# Patient Record
Sex: FEMALE | Race: WHITE | NOT HISPANIC OR LATINO | Employment: FULL TIME | URBAN - METROPOLITAN AREA
[De-identification: names, ages, dates, MRNs, and addresses within clinical notes are randomized per-mention and may not be internally consistent; named-entity substitution may affect disease eponyms.]

---

## 2023-05-22 LAB
ALANINE AMINOTRANSFERASE (SGPT) (U/L) IN SER/PLAS: 17 U/L (ref 7–45)
ALBUMIN (G/DL) IN SER/PLAS: 4.8 G/DL (ref 3.4–5)
ALKALINE PHOSPHATASE (U/L) IN SER/PLAS: 83 U/L (ref 33–110)
ANION GAP IN SER/PLAS: 15 MMOL/L (ref 10–20)
APPEARANCE, URINE: CLEAR
ASPARTATE AMINOTRANSFERASE (SGOT) (U/L) IN SER/PLAS: 18 U/L (ref 9–39)
BASOPHILS (10*3/UL) IN BLOOD BY AUTOMATED COUNT: 0.03 X10E9/L (ref 0–0.1)
BASOPHILS/100 LEUKOCYTES IN BLOOD BY AUTOMATED COUNT: 0.5 % (ref 0–2)
BILIRUBIN TOTAL (MG/DL) IN SER/PLAS: 0.4 MG/DL (ref 0–1.2)
BILIRUBIN, URINE: NEGATIVE
BLOOD, URINE: NEGATIVE
CALCIDIOL (25 OH VITAMIN D3) (NG/ML) IN SER/PLAS: 36 NG/ML
CALCIUM (MG/DL) IN SER/PLAS: 9.8 MG/DL (ref 8.6–10.6)
CARBON DIOXIDE, TOTAL (MMOL/L) IN SER/PLAS: 26 MMOL/L (ref 21–32)
CHLORIDE (MMOL/L) IN SER/PLAS: 102 MMOL/L (ref 98–107)
COBALAMIN (VITAMIN B12) (PG/ML) IN SER/PLAS: 456 PG/ML (ref 211–911)
COLOR, URINE: COLORLESS
CREATININE (MG/DL) IN SER/PLAS: 0.8 MG/DL (ref 0.5–1.05)
EOSINOPHILS (10*3/UL) IN BLOOD BY AUTOMATED COUNT: 0.11 X10E9/L (ref 0–0.7)
EOSINOPHILS/100 LEUKOCYTES IN BLOOD BY AUTOMATED COUNT: 1.7 % (ref 0–6)
ERYTHROCYTE DISTRIBUTION WIDTH (RATIO) BY AUTOMATED COUNT: 12.6 % (ref 11.5–14.5)
ERYTHROCYTE MEAN CORPUSCULAR HEMOGLOBIN CONCENTRATION (G/DL) BY AUTOMATED: 32.8 G/DL (ref 32–36)
ERYTHROCYTE MEAN CORPUSCULAR VOLUME (FL) BY AUTOMATED COUNT: 86 FL (ref 80–100)
ERYTHROCYTES (10*6/UL) IN BLOOD BY AUTOMATED COUNT: 5.06 X10E12/L (ref 4–5.2)
GFR FEMALE: 86 ML/MIN/1.73M2
GLUCOSE (MG/DL) IN SER/PLAS: 64 MG/DL (ref 74–99)
GLUCOSE, URINE: NEGATIVE MG/DL
HEMATOCRIT (%) IN BLOOD BY AUTOMATED COUNT: 43.6 % (ref 36–46)
HEMOGLOBIN (G/DL) IN BLOOD: 14.3 G/DL (ref 12–16)
IMMATURE GRANULOCYTES/100 LEUKOCYTES IN BLOOD BY AUTOMATED COUNT: 0.5 % (ref 0–0.9)
KETONES, URINE: NEGATIVE MG/DL
LEUKOCYTE ESTERASE, URINE: NEGATIVE
LEUKOCYTES (10*3/UL) IN BLOOD BY AUTOMATED COUNT: 6.4 X10E9/L (ref 4.4–11.3)
LYMPHOCYTES (10*3/UL) IN BLOOD BY AUTOMATED COUNT: 1.6 X10E9/L (ref 1.2–4.8)
LYMPHOCYTES/100 LEUKOCYTES IN BLOOD BY AUTOMATED COUNT: 24.9 % (ref 13–44)
MONOCYTES (10*3/UL) IN BLOOD BY AUTOMATED COUNT: 0.37 X10E9/L (ref 0.1–1)
MONOCYTES/100 LEUKOCYTES IN BLOOD BY AUTOMATED COUNT: 5.8 % (ref 2–10)
NEUTROPHILS (10*3/UL) IN BLOOD BY AUTOMATED COUNT: 4.28 X10E9/L (ref 1.2–7.7)
NEUTROPHILS/100 LEUKOCYTES IN BLOOD BY AUTOMATED COUNT: 66.6 % (ref 40–80)
NITRITE, URINE: NEGATIVE
NRBC (PER 100 WBCS) BY AUTOMATED COUNT: 0 /100 WBC (ref 0–0)
PH, URINE: 6 (ref 5–8)
PLATELETS (10*3/UL) IN BLOOD AUTOMATED COUNT: 238 X10E9/L (ref 150–450)
POTASSIUM (MMOL/L) IN SER/PLAS: 3.7 MMOL/L (ref 3.5–5.3)
PROTEIN TOTAL: 7.8 G/DL (ref 6.4–8.2)
PROTEIN, URINE: NEGATIVE MG/DL
SEDIMENTATION RATE, ERYTHROCYTE: 14 MM/H (ref 0–30)
SODIUM (MMOL/L) IN SER/PLAS: 139 MMOL/L (ref 136–145)
SPECIFIC GRAVITY, URINE: 1 (ref 1–1.03)
THYROTROPIN (MIU/L) IN SER/PLAS BY DETECTION LIMIT <= 0.05 MIU/L: 0.63 MIU/L (ref 0.44–3.98)
UREA NITROGEN (MG/DL) IN SER/PLAS: 10 MG/DL (ref 6–23)
UROBILINOGEN, URINE: <2 MG/DL (ref 0–1.9)

## 2023-07-18 LAB
HEPATITIS B VIRUS SURFACE AB (MIU/ML) IN SERUM: <3.1 MIU/ML
MUMPS IGG ANTIBODY: POSITIVE
RUBELLA VIRUS IGG AB: POSITIVE
RUBEOLA IGG ANTIBODY: NEGATIVE
VARICELLA ZOSTER IGG: POSITIVE

## 2023-07-20 LAB
NIL(NEG) CONTROL SPOT COUNT: NORMAL
PANEL A SPOT COUNT: 0
PANEL B SPOT COUNT: 1
POS CONTROL SPOT COUNT: NORMAL
T-SPOT. TB INTERPRETATION: NEGATIVE

## 2023-10-14 ENCOUNTER — APPOINTMENT (OUTPATIENT)
Dept: RADIOLOGY | Facility: HOSPITAL | Age: 58
End: 2023-10-14
Payer: MEDICARE

## 2023-10-14 ENCOUNTER — HOSPITAL ENCOUNTER (EMERGENCY)
Facility: HOSPITAL | Age: 58
Discharge: HOME | End: 2023-10-14
Attending: EMERGENCY MEDICINE
Payer: MEDICARE

## 2023-10-14 VITALS
SYSTOLIC BLOOD PRESSURE: 136 MMHG | TEMPERATURE: 97.3 F | BODY MASS INDEX: 24.75 KG/M2 | WEIGHT: 145 LBS | RESPIRATION RATE: 16 BRPM | OXYGEN SATURATION: 97 % | HEART RATE: 73 BPM | DIASTOLIC BLOOD PRESSURE: 70 MMHG | HEIGHT: 64 IN

## 2023-10-14 DIAGNOSIS — S06.0X0A CONCUSSION WITHOUT LOSS OF CONSCIOUSNESS, INITIAL ENCOUNTER: ICD-10-CM

## 2023-10-14 DIAGNOSIS — S16.1XXA CERVICAL STRAIN, ACUTE, INITIAL ENCOUNTER: Primary | ICD-10-CM

## 2023-10-14 PROCEDURE — 2500000001 HC RX 250 WO HCPCS SELF ADMINISTERED DRUGS (ALT 637 FOR MEDICARE OP): Performed by: EMERGENCY MEDICINE

## 2023-10-14 PROCEDURE — 72125 CT NECK SPINE W/O DYE: CPT | Mod: ME

## 2023-10-14 PROCEDURE — 70450 CT HEAD/BRAIN W/O DYE: CPT | Performed by: RADIOLOGY

## 2023-10-14 PROCEDURE — 70450 CT HEAD/BRAIN W/O DYE: CPT | Mod: MG

## 2023-10-14 PROCEDURE — 72125 CT NECK SPINE W/O DYE: CPT | Performed by: RADIOLOGY

## 2023-10-14 PROCEDURE — 99285 EMERGENCY DEPT VISIT HI MDM: CPT

## 2023-10-14 RX ORDER — CYCLOBENZAPRINE HCL 10 MG
10 TABLET ORAL 2 TIMES DAILY PRN
Qty: 20 TABLET | Refills: 0 | Status: SHIPPED | OUTPATIENT
Start: 2023-10-14 | End: 2023-10-24

## 2023-10-14 RX ORDER — NAPROXEN 500 MG/1
500 TABLET ORAL
Qty: 30 TABLET | Refills: 0 | Status: SHIPPED | OUTPATIENT
Start: 2023-10-14 | End: 2023-10-29

## 2023-10-14 RX ORDER — IBUPROFEN 600 MG/1
600 TABLET ORAL ONCE
Status: COMPLETED | OUTPATIENT
Start: 2023-10-14 | End: 2023-10-14

## 2023-10-14 RX ADMIN — IBUPROFEN 600 MG: 600 TABLET, FILM COATED ORAL at 08:24

## 2023-10-14 ASSESSMENT — LIFESTYLE VARIABLES
EVER HAD A DRINK FIRST THING IN THE MORNING TO STEADY YOUR NERVES TO GET RID OF A HANGOVER: NO
HAVE PEOPLE ANNOYED YOU BY CRITICIZING YOUR DRINKING: NO
HAVE YOU EVER FELT YOU SHOULD CUT DOWN ON YOUR DRINKING: NO
EVER FELT BAD OR GUILTY ABOUT YOUR DRINKING: NO

## 2023-10-14 ASSESSMENT — PAIN SCALES - GENERAL: PAINLEVEL_OUTOF10: 7

## 2023-10-14 ASSESSMENT — PAIN - FUNCTIONAL ASSESSMENT: PAIN_FUNCTIONAL_ASSESSMENT: 0-10
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
## 2023-10-14 NOTE — Clinical Note
Kirti Gaines was seen and treated in our emergency department on 10/14/2023.  She may return to work on 10/16/2023.       If you have any questions or concerns, please don't hesitate to call.      Tyree Leos MD

## 2023-10-14 NOTE — Clinical Note
Reviewed discharge information with patient at bedside. Pt verbalizes understanding, denies other needs or concerns. To follow up with PCP. VSS. Pt ambulates to ED lobby without difficulty.

## 2023-10-17 PROBLEM — R10.2 PELVIC PAIN IN FEMALE: Status: ACTIVE | Noted: 2023-10-17

## 2023-10-17 PROBLEM — J34.89 NASAL OBSTRUCTION: Status: ACTIVE | Noted: 2023-10-17

## 2023-10-17 PROBLEM — K21.9 REFLUX LARYNGITIS: Status: ACTIVE | Noted: 2023-10-17

## 2023-10-17 PROBLEM — J31.0: Status: ACTIVE | Noted: 2023-10-17

## 2023-10-17 PROBLEM — N39.41 URGE URINARY INCONTINENCE: Status: ACTIVE | Noted: 2023-10-17

## 2023-10-17 PROBLEM — R26.89 ANTALGIC GAIT: Status: ACTIVE | Noted: 2023-10-17

## 2023-10-17 PROBLEM — G57.01 PIRIFORMIS SYNDROME OF RIGHT SIDE: Status: ACTIVE | Noted: 2023-10-17

## 2023-10-17 PROBLEM — R42 DIZZINESS: Status: ACTIVE | Noted: 2023-10-17

## 2023-10-17 PROBLEM — R53.81 MALAISE AND FATIGUE: Status: ACTIVE | Noted: 2023-10-17

## 2023-10-17 PROBLEM — M19.071 PRIMARY OSTEOARTHRITIS OF RIGHT FOOT: Status: ACTIVE | Noted: 2023-10-17

## 2023-10-17 PROBLEM — L85.3 XEROSIS CUTIS: Status: ACTIVE | Noted: 2022-03-09

## 2023-10-17 PROBLEM — R51.9 HEADACHE: Status: ACTIVE | Noted: 2023-10-17

## 2023-10-17 PROBLEM — M89.8X7 EXOSTOSIS OF BONE OF FOOT: Status: ACTIVE | Noted: 2023-10-17

## 2023-10-17 PROBLEM — R53.83 MALAISE AND FATIGUE: Status: ACTIVE | Noted: 2023-10-17

## 2023-10-17 PROBLEM — A04.8 H. PYLORI INFECTION: Status: ACTIVE | Noted: 2023-10-17

## 2023-10-17 PROBLEM — J04.0 REFLUX LARYNGITIS: Status: ACTIVE | Noted: 2023-10-17

## 2023-10-17 PROBLEM — G43.909 HEADACHE, MIGRAINE: Status: ACTIVE | Noted: 2023-10-17

## 2023-10-17 PROBLEM — D18.01 HEMANGIOMA OF SKIN AND SUBCUTANEOUS TISSUE: Status: ACTIVE | Noted: 2022-03-09

## 2023-10-17 PROBLEM — R44.8 FACIAL PRESSURE: Status: ACTIVE | Noted: 2023-10-17

## 2023-10-17 PROBLEM — R43.8 DECREASED SENSE OF SMELL: Status: ACTIVE | Noted: 2023-10-17

## 2023-10-17 RX ORDER — OMEPRAZOLE 40 MG/1
1 CAPSULE, DELAYED RELEASE ORAL DAILY
COMMUNITY
Start: 2023-06-13 | End: 2023-10-19 | Stop reason: ALTCHOICE

## 2023-10-19 ENCOUNTER — ANCILLARY PROCEDURE (OUTPATIENT)
Dept: RADIOLOGY | Facility: CLINIC | Age: 58
End: 2023-10-19
Payer: COMMERCIAL

## 2023-10-19 ENCOUNTER — OFFICE VISIT (OUTPATIENT)
Dept: PRIMARY CARE | Facility: CLINIC | Age: 58
End: 2023-10-19
Payer: COMMERCIAL

## 2023-10-19 VITALS
HEART RATE: 86 BPM | SYSTOLIC BLOOD PRESSURE: 122 MMHG | TEMPERATURE: 97.8 F | BODY MASS INDEX: 24.61 KG/M2 | DIASTOLIC BLOOD PRESSURE: 85 MMHG | OXYGEN SATURATION: 96 % | WEIGHT: 143.4 LBS

## 2023-10-19 DIAGNOSIS — S13.9XXD NECK SPRAIN, SUBSEQUENT ENCOUNTER: ICD-10-CM
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
## 2023-10-19 DIAGNOSIS — R07.89 STERNUM PAIN: ICD-10-CM
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
## 2023-10-19 PROBLEM — S13.9XXA NECK SPRAIN: Status: ACTIVE | Noted: 2023-10-19
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
## 2023-10-19 PROCEDURE — 99213 OFFICE O/P EST LOW 20 MIN: CPT | Performed by: INTERNAL MEDICINE

## 2023-10-19 PROCEDURE — 1036F TOBACCO NON-USER: CPT | Performed by: INTERNAL MEDICINE

## 2023-10-19 PROCEDURE — 71120 X-RAY EXAM BREASTBONE 2/>VWS: CPT | Performed by: RADIOLOGY

## 2023-10-19 PROCEDURE — 71120 X-RAY EXAM BREASTBONE 2/>VWS: CPT

## 2023-10-19 RX ORDER — METHOCARBAMOL 500 MG/1
500 TABLET, FILM COATED ORAL 3 TIMES DAILY
Qty: 21 TABLET | Refills: 0 | Status: SHIPPED | OUTPATIENT
Start: 2023-10-19 | End: 2023-10-26

## 2023-10-19 ASSESSMENT — ENCOUNTER SYMPTOMS
DIAPHORESIS: 0
FACIAL ASYMMETRY: 0
SPEECH DIFFICULTY: 0
LIGHT-HEADEDNESS: 0
CONSTIPATION: 0
WHEEZING: 0
SHORTNESS OF BREATH: 0
WEAKNESS: 0
NERVOUS/ANXIOUS: 0
CHILLS: 0
FACIAL SWELLING: 0
DIARRHEA: 0
PALPITATIONS: 0
DIZZINESS: 0
APNEA: 0
DIFFICULTY URINATING: 0
FEVER: 0
APPETITE CHANGE: 0
UNEXPECTED WEIGHT CHANGE: 0
BLOOD IN STOOL: 0
ABDOMINAL DISTENTION: 0
SINUS PRESSURE: 0
CHOKING: 0

## 2023-10-19 ASSESSMENT — PATIENT HEALTH QUESTIONNAIRE - PHQ9
SUM OF ALL RESPONSES TO PHQ9 QUESTIONS 1 AND 2: 0
1. LITTLE INTEREST OR PLEASURE IN DOING THINGS: NOT AT ALL
2. FEELING DOWN, DEPRESSED OR HOPELESS: NOT AT ALL

## 2023-10-19 NOTE — LETTER
October 21, 2023     Patient: Kirti Gaines   YOB: 1965   Date of Visit: 10/19/2023       To Whom It May Concern:    Kirti Gaines was seen in my clinic on 10/19/2023 at 8:30 am. Please excuse Kirti for her absence from work on this day to make the appointment.    If you have any questions or concerns, please don't hesitate to call.         Sincerely,         Dianne Bowman MD        CC: No Recipients

## 2023-10-19 NOTE — LETTER
October 21, 2023     Patient: Kirti Gaines   YOB: 1965   Date of Visit: 10/19/2023       To Whom It May Concern:    Kirti Gaines was seen in my clinic on 10/19/2023 at 8:30 am. Please excuse Kirti from work   Until Sunday, 10/22/2023.  If you have any questions or concerns, please don't hesitate to call.         Sincerely,         Dianne Bowman MD        CC: No Recipients
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
## 2023-10-19 NOTE — LETTER
October 19, 2023     Patient: Kirti Gaines   YOB: 1965   Date of Visit: 10/19/2023       To Whom It May Concern:    Kirti Gaines was seen in my clinic on 10/19/2023 at 8:30 am. Please excuse Kirti for her absence from work on this day to make the appointment.    If you have any questions or concerns, please don't hesitate to call.         Sincerely,         Dianne Bowman MD        CC: No Recipients

## 2023-11-01 ENCOUNTER — EVALUATION (OUTPATIENT)
Dept: PHYSICAL THERAPY | Facility: CLINIC | Age: 58
End: 2023-11-01
Payer: COMMERCIAL

## 2023-11-01 DIAGNOSIS — M54.2 CERVICALGIA: Primary | ICD-10-CM

## 2023-11-01 DIAGNOSIS — S13.9XXD NECK SPRAIN, SUBSEQUENT ENCOUNTER: ICD-10-CM

## 2023-11-01 PROCEDURE — 97161 PT EVAL LOW COMPLEX 20 MIN: CPT | Mod: GP

## 2023-11-01 PROCEDURE — 97110 THERAPEUTIC EXERCISES: CPT | Mod: GP

## 2023-11-01 NOTE — Clinical Note
November 1, 2023     Patient: Kirti Gaines   YOB: 1965   Date of Visit: 11/1/2023       To Whom it May Concern:    Kirti Gaines was seen in my clinic on 11/1/2023. She {Return to school/sport:37997}.    If you have any questions or concerns, please don't hesitate to call.         Sincerely,          Charlene Ellis, PT        CC: No Recipients

## 2023-11-01 NOTE — Clinical Note
November 1, 2023     Patient: Kirti Gaines   YOB: 1965   Date of Visit: 11/1/2023       To Whom It May Concern:    It is my medical opinion that Kirti Gaines {Work release (duty restriction):22153}.    If you have any questions or concerns, please don't hesitate to call.         Sincerely,        Charleen Ellis, PT    CC: No Recipients
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
## 2023-11-08 ENCOUNTER — TREATMENT (OUTPATIENT)
Dept: PHYSICAL THERAPY | Facility: CLINIC | Age: 58
End: 2023-11-08
Payer: COMMERCIAL

## 2023-11-08 DIAGNOSIS — M54.2 CERVICALGIA: Primary | ICD-10-CM

## 2023-11-08 DIAGNOSIS — S13.9XXD NECK SPRAIN, SUBSEQUENT ENCOUNTER: ICD-10-CM

## 2023-11-08 PROCEDURE — 97110 THERAPEUTIC EXERCISES: CPT | Mod: GP,CQ

## 2023-11-08 PROCEDURE — 97140 MANUAL THERAPY 1/> REGIONS: CPT | Mod: GP,CQ
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
## 2023-11-10 ENCOUNTER — TREATMENT (OUTPATIENT)
Dept: PHYSICAL THERAPY | Facility: CLINIC | Age: 58
End: 2023-11-10
Payer: COMMERCIAL

## 2023-11-10 DIAGNOSIS — S13.9XXD NECK SPRAIN, SUBSEQUENT ENCOUNTER: Primary | ICD-10-CM

## 2023-11-10 DIAGNOSIS — M54.2 CERVICALGIA: ICD-10-CM

## 2023-11-10 PROCEDURE — 97110 THERAPEUTIC EXERCISES: CPT | Mod: GP,CQ

## 2023-11-10 PROCEDURE — 97140 MANUAL THERAPY 1/> REGIONS: CPT | Mod: GP,CQ

## 2023-11-10 PROCEDURE — 97014 ELECTRIC STIMULATION THERAPY: CPT | Mod: GP,CQ
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
## 2023-11-13 ENCOUNTER — APPOINTMENT (OUTPATIENT)
Dept: PHYSICAL THERAPY | Facility: CLINIC | Age: 58
End: 2023-11-13
Payer: COMMERCIAL

## 2023-11-15 ENCOUNTER — TREATMENT (OUTPATIENT)
Dept: PHYSICAL THERAPY | Facility: CLINIC | Age: 58
End: 2023-11-15
Payer: COMMERCIAL

## 2023-11-15 DIAGNOSIS — S13.9XXD NECK SPRAIN, SUBSEQUENT ENCOUNTER: ICD-10-CM

## 2023-11-15 DIAGNOSIS — M54.2 CERVICALGIA: Primary | ICD-10-CM

## 2023-11-15 PROCEDURE — 97014 ELECTRIC STIMULATION THERAPY: CPT | Mod: GP,CQ

## 2023-11-15 PROCEDURE — 97140 MANUAL THERAPY 1/> REGIONS: CPT | Mod: GP,CQ

## 2023-11-15 PROCEDURE — 97110 THERAPEUTIC EXERCISES: CPT | Mod: GP,CQ
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
## 2023-11-20 ENCOUNTER — DOCUMENTATION (OUTPATIENT)
Dept: PHYSICAL THERAPY | Facility: CLINIC | Age: 58
End: 2023-11-20
Payer: COMMERCIAL

## 2023-11-20 ENCOUNTER — APPOINTMENT (OUTPATIENT)
Dept: PHYSICAL THERAPY | Facility: CLINIC | Age: 58
End: 2023-11-20
Payer: COMMERCIAL

## 2023-11-20 NOTE — PROGRESS NOTES
"Physical Therapy                 Therapy Communication Note    Patient Name: Kirti Gaines \"Halie\"  MRN: [id number]  Today's Date: 11/20/2023     Discipline: Physical Therapy    Missed Visit Reason:      Missed Time: No Show    Comment:  "

## 2023-11-22 ENCOUNTER — TREATMENT (OUTPATIENT)
Dept: PHYSICAL THERAPY | Facility: CLINIC | Age: 58
End: 2023-11-22
Payer: COMMERCIAL

## 2023-11-22 DIAGNOSIS — S13.9XXD NECK SPRAIN, SUBSEQUENT ENCOUNTER: ICD-10-CM

## 2023-11-22 DIAGNOSIS — M54.2 CERVICALGIA: Primary | ICD-10-CM

## 2023-11-22 PROCEDURE — 97110 THERAPEUTIC EXERCISES: CPT | Mod: GP

## 2023-11-22 PROCEDURE — 97140 MANUAL THERAPY 1/> REGIONS: CPT | Mod: GP
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
## 2023-11-28 ENCOUNTER — APPOINTMENT (OUTPATIENT)
Dept: PHYSICAL THERAPY | Facility: CLINIC | Age: 58
End: 2023-11-28
Payer: COMMERCIAL

## 2023-11-28 ENCOUNTER — DOCUMENTATION (OUTPATIENT)
Dept: PHYSICAL THERAPY | Facility: CLINIC | Age: 58
End: 2023-11-28
Payer: COMMERCIAL

## 2023-11-28 NOTE — PROGRESS NOTES
"Physical Therapy                 Therapy Communication Note    Patient Name: Kirti Gaines \"Halie\"  MRN: [id number]  Today's Date: 11/28/2023     Discipline: Physical Therapy    Missed Visit Reason:      Missed Time: Cancel    Comment:Pt cancelled due to weather  "

## 2023-11-30 ENCOUNTER — TREATMENT (OUTPATIENT)
Dept: PHYSICAL THERAPY | Facility: CLINIC | Age: 58
End: 2023-11-30
Payer: COMMERCIAL

## 2023-11-30 DIAGNOSIS — M54.2 CERVICALGIA: ICD-10-CM

## 2023-11-30 DIAGNOSIS — S13.9XXD NECK SPRAIN, SUBSEQUENT ENCOUNTER: Primary | ICD-10-CM

## 2023-11-30 PROCEDURE — 97110 THERAPEUTIC EXERCISES: CPT | Mod: GP
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
## 2024-04-22 ENCOUNTER — LAB (OUTPATIENT)
Dept: LAB | Facility: LAB | Age: 59
End: 2024-04-22
Payer: COMMERCIAL

## 2024-04-22 LAB — COTININE UR QL SCN: NEGATIVE

## 2024-12-09 DIAGNOSIS — R09.81 NASAL CONGESTION: Primary | ICD-10-CM

## 2024-12-09 RX ORDER — TRIAMCINOLONE ACETONIDE 55 UG/1
SPRAY, METERED NASAL
Qty: 16.5 G | Refills: 1 | Status: SHIPPED | OUTPATIENT
Start: 2024-12-09

## 2024-12-09 RX ORDER — AZELASTINE 1 MG/ML
SPRAY, METERED NASAL
Qty: 30 ML | Refills: 1 | Status: SHIPPED | OUTPATIENT
Start: 2024-12-09

## 2024-12-16 ENCOUNTER — APPOINTMENT (OUTPATIENT)
Dept: OTOLARYNGOLOGY | Facility: CLINIC | Age: 59
End: 2024-12-16
Payer: COMMERCIAL

## 2025-03-05 ENCOUNTER — OFFICE VISIT (OUTPATIENT)
Dept: PRIMARY CARE | Facility: CLINIC | Age: 60
End: 2025-03-05
Payer: COMMERCIAL

## 2025-03-05 ENCOUNTER — HOSPITAL ENCOUNTER (OUTPATIENT)
Dept: RADIOLOGY | Facility: CLINIC | Age: 60
Discharge: HOME | End: 2025-03-05
Payer: COMMERCIAL

## 2025-03-05 VITALS
SYSTOLIC BLOOD PRESSURE: 138 MMHG | HEART RATE: 103 BPM | HEIGHT: 64 IN | DIASTOLIC BLOOD PRESSURE: 88 MMHG | OXYGEN SATURATION: 96 % | BODY MASS INDEX: 24.61 KG/M2

## 2025-03-05 DIAGNOSIS — R07.89 CHEST WALL PAIN: ICD-10-CM

## 2025-03-05 DIAGNOSIS — K59.00 CONSTIPATION, UNSPECIFIED CONSTIPATION TYPE: ICD-10-CM

## 2025-03-05 DIAGNOSIS — R68.89 INFLUENZA-LIKE SYMPTOMS: ICD-10-CM

## 2025-03-05 DIAGNOSIS — R68.89 INFLUENZA-LIKE SYMPTOMS: Primary | ICD-10-CM

## 2025-03-05 PROCEDURE — 71046 X-RAY EXAM CHEST 2 VIEWS: CPT

## 2025-03-05 PROCEDURE — 1036F TOBACCO NON-USER: CPT | Performed by: INTERNAL MEDICINE

## 2025-03-05 PROCEDURE — 99213 OFFICE O/P EST LOW 20 MIN: CPT | Performed by: INTERNAL MEDICINE

## 2025-03-05 RX ORDER — IBUPROFEN 600 MG/1
600 TABLET ORAL 3 TIMES DAILY PRN
Qty: 30 TABLET | Refills: 0 | Status: SHIPPED | OUTPATIENT
Start: 2025-03-05 | End: 2025-03-15

## 2025-03-05 RX ORDER — OSELTAMIVIR PHOSPHATE 75 MG/1
75 CAPSULE ORAL 2 TIMES DAILY
Qty: 10 CAPSULE | Refills: 0 | Status: SHIPPED | OUTPATIENT
Start: 2025-03-05 | End: 2025-03-10

## 2025-03-05 ASSESSMENT — PATIENT HEALTH QUESTIONNAIRE - PHQ9
1. LITTLE INTEREST OR PLEASURE IN DOING THINGS: NOT AT ALL
2. FEELING DOWN, DEPRESSED OR HOPELESS: NOT AT ALL
SUM OF ALL RESPONSES TO PHQ9 QUESTIONS 1 & 2: 0

## 2025-03-05 NOTE — PATIENT INSTRUCTIONS
Take medications as prescribed  Drink more fluids and eat bland food  Take over-the-counter laxative  Go to ER for any high fever or shortness of breath.

## 2025-03-05 NOTE — PROGRESS NOTES
"Subjective   Patient ID: Halie Gaines is a 59 y.o. female who presents for Sick Visit (X 3 Days), Chills, Fatigue, Sore Throat, and Generalized Body Aches.    HPI   Since Monday sick,has chills,body aches  Tired  Back hurts over spine  Has sorethroat  Has cough  No cp  No worsening sob  No n/v/d  No bm all week  Works at doctor's office  Had flu shot  Covid neg    Review of Systems  See HPI  Objective   /88   Pulse 103   Ht 1.626 m (5' 4\")   SpO2 96%   BMI 24.61 kg/m²     Physical Exam  Looks very tired and in moderate distress from pain  Has difficulty lying down on table due to her pain nonspine  No pallor or icterus  Eyes watering  No sinus tenderness or throat erythema  Normal tympanic membrane  Neck without stiffness or lymphadenopathy  Chest clear on exam  CVS: S1-S2 regular slightly tachycardic.  No murmur no edema  Abdomen soft gas distention.  No masses and bowel sounds heard.  No tenderness  Extremities no edema  No skin rash  Alert and oriented  Assessment/Plan   Problem List Items Addressed This Visit             ICD-10-CM    Influenza-like symptoms - Primary R68.89    Relevant Medications    oseltamivir (Tamiflu) 75 mg capsule    ibuprofen 600 mg tablet    Other Relevant Orders    XR chest 2 views    Constipation K59.00     Other Visit Diagnoses         Codes    Chest wall pain     R07.89    Relevant Medications    ibuprofen 600 mg tablet    Other Relevant Orders    XR chest 2 views        Likely influenza  Since she has severe pain in her spine and in distress will get a chest x-ray to rule out pneumonia  Ibuprofen prescribed  Go to ER for any shortness of breath or very high fever or vomiting or worsening pain  Work excuse till next Monday  Call me back for persisting problems  Take laxatives and drink a lot of fluids to treat constipation       "

## 2025-03-12 DIAGNOSIS — B00.1 COLD SORE: Primary | ICD-10-CM

## 2025-03-12 RX ORDER — ACYCLOVIR 50 MG/G
1 OINTMENT TOPICAL
Qty: 5 G | Refills: 0 | Status: SHIPPED | OUTPATIENT
Start: 2025-03-12 | End: 2025-03-13 | Stop reason: SDUPTHER

## 2025-03-13 ENCOUNTER — TELEPHONE (OUTPATIENT)
Dept: PRIMARY CARE | Facility: CLINIC | Age: 60
End: 2025-03-13
Payer: COMMERCIAL

## 2025-03-13 DIAGNOSIS — B00.1 COLD SORE: ICD-10-CM

## 2025-03-13 RX ORDER — ACYCLOVIR 50 MG/G
1 OINTMENT TOPICAL
Qty: 15 G | Refills: 0 | Status: SHIPPED | OUTPATIENT
Start: 2025-03-13 | End: 2025-03-17

## 2025-03-13 NOTE — TELEPHONE ENCOUNTER
[facility] called to clarify the patients acyclovir ointment. They said the ointment only comes in 15 grams but they can do the cream as 5 gram. Please advise.

## 2025-03-24 ENCOUNTER — APPOINTMENT (OUTPATIENT)
Dept: PRIMARY CARE | Facility: CLINIC | Age: 60
End: 2025-03-24
Payer: COMMERCIAL

## 2025-03-24 VITALS
WEIGHT: 162.6 LBS | HEIGHT: 64 IN | DIASTOLIC BLOOD PRESSURE: 80 MMHG | BODY MASS INDEX: 27.76 KG/M2 | SYSTOLIC BLOOD PRESSURE: 120 MMHG | OXYGEN SATURATION: 95 % | HEART RATE: 85 BPM

## 2025-03-24 DIAGNOSIS — F41.9 ANXIETY: ICD-10-CM

## 2025-03-24 DIAGNOSIS — Z00.00 ANNUAL PHYSICAL EXAM: Primary | ICD-10-CM

## 2025-03-24 DIAGNOSIS — Z12.31 ENCOUNTER FOR SCREENING MAMMOGRAM FOR BREAST CANCER: ICD-10-CM

## 2025-03-24 DIAGNOSIS — E55.9 VITAMIN D DEFICIENCY: ICD-10-CM

## 2025-03-24 DIAGNOSIS — R06.02 SHORTNESS OF BREATH: ICD-10-CM

## 2025-03-24 PROBLEM — K59.00 CONSTIPATION: Status: RESOLVED | Noted: 2025-03-05 | Resolved: 2025-03-24

## 2025-03-24 PROBLEM — R68.89 INFLUENZA-LIKE SYMPTOMS: Status: RESOLVED | Noted: 2025-03-05 | Resolved: 2025-03-24

## 2025-03-24 PROCEDURE — 99396 PREV VISIT EST AGE 40-64: CPT | Performed by: INTERNAL MEDICINE

## 2025-03-24 PROCEDURE — 90677 PCV20 VACCINE IM: CPT | Performed by: INTERNAL MEDICINE

## 2025-03-24 PROCEDURE — 3008F BODY MASS INDEX DOCD: CPT | Performed by: INTERNAL MEDICINE

## 2025-03-24 PROCEDURE — 1036F TOBACCO NON-USER: CPT | Performed by: INTERNAL MEDICINE

## 2025-03-24 PROCEDURE — 90471 IMMUNIZATION ADMIN: CPT | Performed by: INTERNAL MEDICINE

## 2025-03-24 ASSESSMENT — ANXIETY QUESTIONNAIRES
2. NOT BEING ABLE TO STOP OR CONTROL WORRYING: NEARLY EVERY DAY
5. BEING SO RESTLESS THAT IT IS HARD TO SIT STILL: SEVERAL DAYS
1. FEELING NERVOUS, ANXIOUS, OR ON EDGE: NEARLY EVERY DAY
6. BECOMING EASILY ANNOYED OR IRRITABLE: NEARLY EVERY DAY
4. TROUBLE RELAXING: NEARLY EVERY DAY
7. FEELING AFRAID AS IF SOMETHING AWFUL MIGHT HAPPEN: NEARLY EVERY DAY
GAD7 TOTAL SCORE: 19
3. WORRYING TOO MUCH ABOUT DIFFERENT THINGS: NEARLY EVERY DAY
IF YOU CHECKED OFF ANY PROBLEMS ON THIS QUESTIONNAIRE, HOW DIFFICULT HAVE THESE PROBLEMS MADE IT FOR YOU TO DO YOUR WORK, TAKE CARE OF THINGS AT HOME, OR GET ALONG WITH OTHER PEOPLE: SOMEWHAT DIFFICULT

## 2025-03-24 ASSESSMENT — PATIENT HEALTH QUESTIONNAIRE - PHQ9
2. FEELING DOWN, DEPRESSED OR HOPELESS: NOT AT ALL
SUM OF ALL RESPONSES TO PHQ9 QUESTIONS 1 & 2: 0
1. LITTLE INTEREST OR PLEASURE IN DOING THINGS: NOT AT ALL

## 2025-03-24 NOTE — PROGRESS NOTES
"Subjective   Patient ID: Kirti Gaines \"[place]" is a 59 y.o. female who presents for Annual Exam.  HPI    Patient is here for annual exam  Has sob with exertion  Has been going on for long time  Has h/o lung collapse right at age 26.  Has seen periodontist,has gum issues.   Has anxiety,getting worse.  Consumes healthy diet    Does not do   regular exercise/active   pregnancy [id number]  No bladder symptoms  Cervical cancer screen current normal   No history of abnormal Pap  Mammogram due   Colonoscopy   Medical conditions:has sob and anxiety  Vaccines:prevnar.shingrix due    Review of Systems  General: No significant change in weight, no fatigue, no fever, chills, or night sweats.  HEENT: has sinus headache, dizziness, syncope. No blurred vision, double vision. No hearing loss, or ringing. Has  nasal discharge, sinus problems. No loose teeth, sore throat, hoarseness or neck stiffness.   Breast: No pain or discharge. No mass felt.   Respiratory: No cough, mucous in throat, hemoptysis, wheezing, has  shortness of breath. No snoring.  Cardiac: No chest pain, palpitations, orthopnea. No leg swelling or claudication pain.   Gastrointestinal: No indigestion, heartburn, nausea, vomiting, diarrhea, constipation, rectal bleeding or hemorrhoids.  Genitourinary: No UTI symptoms, stones, incontinence.  OBGYN: No abnormal bleeding, No pelvic pain or bloating.  Endocrine: No excess thirst or urination.  Hematopoietic: No anemia, easy bruising or bleeding. No history of blood transfusion.  Neuro: No localized weakness, numbness or tingling. No tremor, history of seizure. No history of memory problems.  Psychological: No depression or sleep disturbance.    HISTORY  Social History     Socioeconomic History    Marital status:      Spouse name: Not on file    Number of children: Not on file    Years of education: Not on file    Highest education level: Not on file   Occupational History    Not on file   Tobacco Use "    Smoking status: Former     Current packs/day: 0.00     Types: Cigarettes     Quit date:      Years since quittin.2    Smokeless tobacco: Never   Substance and Sexual Activity    Alcohol use: Never    Drug use: Never    Sexual activity: Not on file   Other Topics Concern    Not on file   Social History Narrative    Not on file     Social Drivers of Health     Financial Resource Strain: Medium Risk (10/1/2020)    Received from [facility]    Overall Financial Resource Strain (CARDIA)     Difficulty of Paying Living Expenses: Somewhat hard   Food Insecurity: Food Insecurity Present (10/1/2020)    Received from [facility]    Hunger Vital Sign     Worried About Running Out of Food in the Last Year: Sometimes true     Ran Out of Food in the Last Year: Never true   Transportation Needs: No Transportation Needs (10/1/2020)    Received from [facility]    PRAPARE - Transportation     Lack of Transportation (Medical): No     Lack of Transportation (Non-Medical): No   Physical Activity: Inactive (2020)    Received from [facility]    Exercise Vital Sign     Days of Exercise per Week: 0 days     Minutes of Exercise per Session: 0 min   Stress: Stress Concern Present (2020)    Received from [facility]    [employer] [place] [employer] - Occupational Stress Questionnaire     Feeling of Stress : To some extent   Social Connections: Socially Isolated (2020)    Received from [facility]    Social Connection and Isolation Panel [NHANES]     Frequency of Communication with Friends and Family: Twice a week     Frequency of Social Gatherings with Friends and Family: Never     Attends L[employer] day Services: Never     Active Member of Clubs or Organizations: No     Attends Club or Organization Meetings: Never     Marital Status:    Intimate Partner Violence:  "Not on file   Housing Stability: Low Risk  (6/16/2020)    [facility]    Housing Stability Vital Sign     Unable to Pay for Housing in the Last Year: No     Number of Places Lived in the Last Year: 1     Unstable Housing in the Last Year: No     Family History   Problem Relation Name Age of Onset    Ovarian cancer Mother Gaye Alfaro     Cancer Mother Gaye Alfaro     Coronary artery disease Father Florencio Alfaro     Lung cancer Father Florencio Alfaro     Heart failure Father Florencio Alfaro     Migraines Father Florencio Alfaro     Throat cancer Brother       Past Medical History:   Diagnosis Date    Atelectasis     Collapse of right lung    Dental disease unknown    Other specified anxiety disorders 10/16/2014    Depression with anxiety    Personal history of other diseases of the nervous system and sense organs     History of migraine     Past Surgical History:   Procedure Laterality Date    CHOLECYSTECTOMY  03/04/2014    Cholecystectomy    NASAL SEPTUM SURGERY  03/04/2014    Nasal Septal Deviation Repair     @VACCINES  Objective   /80   Pulse 85   Ht 1.626 m (5' 4\")   Wt 73.8 kg (162 lb 9.6 oz)   SpO2 95%   BMI 27.91 kg/m²      Lab Results   Component Value Date    WBC 6.4 05/22/2023    HGB 14.3 05/22/2023    HCT 43.6 05/22/2023    MCV 86 05/22/2023     05/22/2023   PAP@MAMMOGRAM  Physical Exam    Chaperone offered and declined.  Constitutional: Alert and in no acute distress. Well developed, well nourished.   Eyes: Normal external exam. Pupils were equal in size, round, reactive to light (PERRL) with normal accommodation and extraocular movements intact (EOMI).   Ears, Nose, Mouth, and Throat: Otoscopic examination: Normal.  Hearing: Normal.    Neck: No neck mass was observed. Supple. Thyroid not enlarged and there were no palpable thyroid nodules.   Cardiovascular: Heart rate and rhythm were normal, normal S1 and S2, no gallops, no murmurs and no pericardial rub. " Pedal pulses: Normal. No peripheral edema.   Pulmonary: No respiratory distress. Clear bilateral breath sounds.   Abdomen: Soft nontender; no abdominal mass palpated. No organomegaly. declined.   Genitourinary: declined.   Musculoskeletal: Gait and station: Normal. Has thickening of the palmar tendon with probable ganglion cyst on right side. Range of motion: Normal.  Muscle strength/tone: Normal.    Skin: No rash  Psychiatric: Judgment and insight: Intact. Mood and affect: Normal.   Lymphatic: No cervical lymphadenopathy.     Breast exam:declined  Assessment/Plan   Problem List Items Addressed This Visit       Annual physical exam - Primary    Relevant Orders    CBC and Auto Differential    Comprehensive Metabolic Panel    Lipid Panel    Anxiety    Shortness of breath    Relevant Orders    Complete Pulmonary Function Test (Spirometry/DLCO/Lung Volumes)    Vitamin D deficiency    Relevant Orders    Vitamin D 25-Hydroxy,Total (for eval of Vitamin D levels)     Other Visit Diagnoses       Encounter for screening mammogram for breast cancer        Relevant Orders    BI mammo bilateral screening tomosynthesis        Preventive exam and counseling completed  Mammogram ordered  Colonoscopy normal 2021 and Pap normal 2021  Discussed regular exercise including strength training  PFT ordered to assess shortness of breath  Last one in 2009 showed mild obstruction    Can try gluten and dairy free diet for some time to see if sinus symptoms improve  Has anxiety.  Not keen on taking drugs  Can try ashwagandha drops for some time  Check vitamin D level since low in the past and check routine labs  Follow-up in 3 months

## 2025-03-25 ENCOUNTER — HOSPITAL ENCOUNTER (OUTPATIENT)
Dept: RESPIRATORY THERAPY | Facility: HOSPITAL | Age: 60
Discharge: HOME | End: 2025-03-25
Payer: COMMERCIAL

## 2025-03-25 DIAGNOSIS — R06.02 SHORTNESS OF BREATH: ICD-10-CM

## 2025-03-25 PROCEDURE — 94726 PLETHYSMOGRAPHY LUNG VOLUMES: CPT

## 2025-03-28 LAB
MGC ASCENT PFT - FEV1 - PRE: 2.5
MGC ASCENT PFT - FEV1 - PREDICTED: 2.39
MGC ASCENT PFT - FVC - PRE: 3.33
MGC ASCENT PFT - FVC - PREDICTED: 3

## 2025-03-29 ENCOUNTER — HOSPITAL ENCOUNTER (OUTPATIENT)
Dept: RADIOLOGY | Facility: CLINIC | Age: 60
Discharge: HOME | End: 2025-03-29
Payer: COMMERCIAL

## 2025-03-29 ENCOUNTER — APPOINTMENT (OUTPATIENT)
Dept: RADIOLOGY | Facility: CLINIC | Age: 60
End: 2025-03-29
Payer: COMMERCIAL

## 2025-03-29 VITALS — BODY MASS INDEX: 27.76 KG/M2 | WEIGHT: 162.6 LBS | HEIGHT: 64 IN

## 2025-03-29 DIAGNOSIS — Z12.31 ENCOUNTER FOR SCREENING MAMMOGRAM FOR BREAST CANCER: ICD-10-CM

## 2025-03-29 PROCEDURE — 77067 SCR MAMMO BI INCL CAD: CPT | Performed by: RADIOLOGY

## 2025-03-29 PROCEDURE — 77063 BREAST TOMOSYNTHESIS BI: CPT

## 2025-03-29 PROCEDURE — 77063 BREAST TOMOSYNTHESIS BI: CPT | Performed by: RADIOLOGY

## 2025-03-30 LAB
25(OH)D3+25(OH)D2 SERPL-MCNC: 25 NG/ML (ref 30–100)
ALBUMIN SERPL-MCNC: 4.6 G/DL (ref 3.6–5.1)
ALP SERPL-CCNC: 77 U/L (ref 37–153)
ALT SERPL-CCNC: 16 U/L (ref 6–29)
ANION GAP SERPL CALCULATED.4IONS-SCNC: 10 MMOL/L (CALC) (ref 7–17)
AST SERPL-CCNC: 17 U/L (ref 10–35)
BASOPHILS # BLD AUTO: 39 CELLS/UL (ref 0–200)
BASOPHILS NFR BLD AUTO: 0.7 %
BILIRUB SERPL-MCNC: 0.6 MG/DL (ref 0.2–1.2)
BUN SERPL-MCNC: 13 MG/DL (ref 7–25)
CALCIUM SERPL-MCNC: 9.6 MG/DL (ref 8.6–10.4)
CHLORIDE SERPL-SCNC: 102 MMOL/L (ref 98–110)
CHOLEST SERPL-MCNC: 183 MG/DL
CHOLEST/HDLC SERPL: 3 (CALC)
CO2 SERPL-SCNC: 27 MMOL/L (ref 20–32)
CREAT SERPL-MCNC: 0.75 MG/DL (ref 0.5–1.03)
EGFRCR SERPLBLD CKD-EPI 2021: 92 ML/MIN/1.73M2
EOSINOPHIL # BLD AUTO: 160 CELLS/UL (ref 15–500)
EOSINOPHIL NFR BLD AUTO: 2.9 %
ERYTHROCYTE [DISTWIDTH] IN BLOOD BY AUTOMATED COUNT: 12.7 % (ref 11–15)
GLUCOSE SERPL-MCNC: 90 MG/DL (ref 65–99)
HCT VFR BLD AUTO: 40.5 % (ref 35–45)
HDLC SERPL-MCNC: 61 MG/DL
HGB BLD-MCNC: 13.6 G/DL (ref 11.7–15.5)
LDLC SERPL CALC-MCNC: 105 MG/DL (CALC)
LYMPHOCYTES # BLD AUTO: 1007 CELLS/UL (ref 850–3900)
LYMPHOCYTES NFR BLD AUTO: 18.3 %
MCH RBC QN AUTO: 28.6 PG (ref 27–33)
MCHC RBC AUTO-ENTMCNC: 33.6 G/DL (ref 32–36)
MCV RBC AUTO: 85.1 FL (ref 80–100)
MONOCYTES # BLD AUTO: 495 CELLS/UL (ref 200–950)
MONOCYTES NFR BLD AUTO: 9 %
NEUTROPHILS # BLD AUTO: 3801 CELLS/UL (ref 1500–7800)
NEUTROPHILS NFR BLD AUTO: 69.1 %
NONHDLC SERPL-MCNC: 122 MG/DL (CALC)
PLATELET # BLD AUTO: 200 THOUSAND/UL (ref 140–400)
PMV BLD REES-ECKER: 10.7 FL (ref 7.5–12.5)
POTASSIUM SERPL-SCNC: 4.5 MMOL/L (ref 3.5–5.3)
PROT SERPL-MCNC: 7.6 G/DL (ref 6.1–8.1)
RBC # BLD AUTO: 4.76 MILLION/UL (ref 3.8–5.1)
SODIUM SERPL-SCNC: 139 MMOL/L (ref 135–146)
TRIGL SERPL-MCNC: 77 MG/DL
WBC # BLD AUTO: 5.5 THOUSAND/UL (ref 3.8–10.8)

## 2025-04-21 ENCOUNTER — APPOINTMENT (OUTPATIENT)
Dept: OPHTHALMOLOGY | Facility: CLINIC | Age: 60
End: 2025-04-21
Payer: COMMERCIAL

## 2025-06-24 ENCOUNTER — APPOINTMENT (OUTPATIENT)
Dept: PRIMARY CARE | Facility: CLINIC | Age: 60
End: 2025-06-24
Payer: COMMERCIAL